# Patient Record
Sex: MALE | Race: ASIAN | URBAN - METROPOLITAN AREA
[De-identification: names, ages, dates, MRNs, and addresses within clinical notes are randomized per-mention and may not be internally consistent; named-entity substitution may affect disease eponyms.]

---

## 2020-02-22 ENCOUNTER — OFFICE VISIT (OUTPATIENT)
Dept: URGENT CARE | Facility: CLINIC | Age: 60
End: 2020-02-22
Payer: COMMERCIAL

## 2020-02-22 VITALS
DIASTOLIC BLOOD PRESSURE: 82 MMHG | SYSTOLIC BLOOD PRESSURE: 130 MMHG | OXYGEN SATURATION: 96 % | TEMPERATURE: 98 F | WEIGHT: 171.94 LBS | BODY MASS INDEX: 25.47 KG/M2 | HEIGHT: 69 IN | HEART RATE: 64 BPM | RESPIRATION RATE: 18 BRPM

## 2020-02-22 DIAGNOSIS — Z76.0 ENCOUNTER FOR MEDICATION REFILL: Primary | ICD-10-CM

## 2020-02-22 DIAGNOSIS — Z76.89 ENCOUNTER TO ESTABLISH CARE WITH NEW DOCTOR: ICD-10-CM

## 2020-02-22 PROCEDURE — 99203 OFFICE O/P NEW LOW 30 MIN: CPT | Mod: S$GLB,,, | Performed by: FAMILY MEDICINE

## 2020-02-22 PROCEDURE — 99203 PR OFFICE/OUTPT VISIT, NEW, LEVL III, 30-44 MIN: ICD-10-PCS | Mod: S$GLB,,, | Performed by: FAMILY MEDICINE

## 2020-02-22 RX ORDER — ROSUVASTATIN CALCIUM 5 MG/1
2.5 TABLET, COATED ORAL DAILY
Qty: 30 TABLET | Refills: 0 | Status: SHIPPED | OUTPATIENT
Start: 2020-02-22 | End: 2020-03-23 | Stop reason: SDUPTHER

## 2020-02-22 RX ORDER — TELMISARTAN 40 MG/1
40 TABLET ORAL DAILY
COMMUNITY
End: 2020-02-22 | Stop reason: SDUPTHER

## 2020-02-22 RX ORDER — TELMISARTAN 40 MG/1
40 TABLET ORAL DAILY
Qty: 30 TABLET | Refills: 0 | Status: SHIPPED | OUTPATIENT
Start: 2020-02-22 | End: 2020-03-23 | Stop reason: SDUPTHER

## 2020-02-22 RX ORDER — AMLODIPINE BESYLATE 2.5 MG/1
2.5 TABLET ORAL DAILY
Qty: 30 TABLET | Refills: 0 | Status: SHIPPED | OUTPATIENT
Start: 2020-02-22 | End: 2020-03-23 | Stop reason: SDUPTHER

## 2020-02-22 RX ORDER — AMLODIPINE BESYLATE 2.5 MG/1
2.5 TABLET ORAL DAILY
COMMUNITY
End: 2020-02-22 | Stop reason: SDUPTHER

## 2020-02-22 RX ORDER — ZOLPIDEM TARTRATE 5 MG/1
5 TABLET ORAL NIGHTLY PRN
COMMUNITY
End: 2020-02-22 | Stop reason: SDUPTHER

## 2020-02-22 RX ORDER — ZOLPIDEM TARTRATE 5 MG/1
5 TABLET ORAL NIGHTLY PRN
Qty: 30 TABLET | Refills: 0 | Status: SHIPPED | OUTPATIENT
Start: 2020-02-22 | End: 2020-03-23 | Stop reason: SDUPTHER

## 2020-02-22 RX ORDER — ROSUVASTATIN CALCIUM 5 MG/1
2.5 TABLET, COATED ORAL DAILY
COMMUNITY
End: 2020-02-22 | Stop reason: SDUPTHER

## 2020-02-22 NOTE — PROGRESS NOTES
"Subjective:       Patient ID: Mago Lema is a 69 y.o. male.    Vitals:  height is 5' 9.29" (1.76 m) and weight is 78 kg (171 lb 15.3 oz). His temperature is 97.9 °F (36.6 °C). His blood pressure is 130/82 and his pulse is 64. His respiration is 18 and oxygen saturation is 96%.     Chief Complaint: Hypertension    Pt speaks Swedish using PENNIE    Pt states he needs refill on all of his medication. He moved here and does not have a PCP. No cp, sob, headache    Hypertension   This is a new problem. The current episode started today. The problem is unchanged. Pertinent negatives include no blurred vision, chest pain, headaches or shortness of breath.       Constitution: Negative for chills, fatigue and fever.   HENT: Negative for congestion and sore throat.    Neck: Negative for painful lymph nodes.   Cardiovascular: Negative for chest pain and leg swelling.   Eyes: Negative for double vision and blurred vision.   Respiratory: Negative for cough and shortness of breath.    Gastrointestinal: Negative for nausea, vomiting and diarrhea.   Genitourinary: Negative for dysuria, frequency and urgency.   Musculoskeletal: Negative for joint pain, joint swelling, muscle cramps and muscle ache.   Skin: Negative for color change, pale and rash.   Allergic/Immunologic: Negative for seasonal allergies.   Neurological: Negative for dizziness, history of vertigo, light-headedness, passing out and headaches.   Hematologic/Lymphatic: Negative for swollen lymph nodes, easy bruising/bleeding and history of blood clots. Does not bruise/bleed easily.   Psychiatric/Behavioral: Negative for nervous/anxious, sleep disturbance and depression. The patient is not nervous/anxious.        Objective:      Physical Exam   Constitutional: He is oriented to person, place, and time. He appears well-developed and well-nourished. He is cooperative.  Non-toxic appearance. He does not appear ill. No distress.   HENT:   Head: Normocephalic and atraumatic. "   Right Ear: Hearing, tympanic membrane, external ear and ear canal normal.   Left Ear: Hearing, tympanic membrane, external ear and ear canal normal.   Nose: Nose normal. No mucosal edema, rhinorrhea or nasal deformity. No epistaxis. Right sinus exhibits no maxillary sinus tenderness and no frontal sinus tenderness. Left sinus exhibits no maxillary sinus tenderness and no frontal sinus tenderness.   Mouth/Throat: Uvula is midline, oropharynx is clear and moist and mucous membranes are normal. No trismus in the jaw. Normal dentition. No uvula swelling. No posterior oropharyngeal erythema.   Eyes: Conjunctivae and lids are normal. Right eye exhibits no discharge. Left eye exhibits no discharge. No scleral icterus.   Neck: Trachea normal, normal range of motion, full passive range of motion without pain and phonation normal. Neck supple.   Cardiovascular: Normal rate, regular rhythm, normal heart sounds, intact distal pulses and normal pulses.   Pulmonary/Chest: Effort normal and breath sounds normal. No respiratory distress.   Abdominal: Soft. Normal appearance and bowel sounds are normal. He exhibits no distension, no pulsatile midline mass and no mass. There is no tenderness.   Musculoskeletal: Normal range of motion. He exhibits no edema or deformity.   Neurological: He is alert and oriented to person, place, and time. He exhibits normal muscle tone. Coordination normal.   Skin: Skin is warm, dry, intact, not diaphoretic and not pale.   Psychiatric: He has a normal mood and affect. His speech is normal and behavior is normal. Judgment and thought content normal. Cognition and memory are normal.   Nursing note and vitals reviewed.        Assessment:       1. Encounter for medication refill    2. Encounter to establish care with new doctor        Plan:         Encounter for medication refill  -     zolpidem (AMBIEN) 5 MG Tab; Take 1 tablet (5 mg total) by mouth nightly as needed.  Dispense: 30 tablet; Refill: 0  -      telmisartan (MICARDIS) 40 MG Tab; Take 1 tablet (40 mg total) by mouth once daily.  Dispense: 30 tablet; Refill: 0  -     rosuvastatin (CRESTOR) 5 MG tablet; Take 0.5 tablets (2.5 mg total) by mouth once daily.  Dispense: 30 tablet; Refill: 0  -     amLODIPine (NORVASC) 2.5 MG tablet; Take 1 tablet (2.5 mg total) by mouth once daily.  Dispense: 30 tablet; Refill: 0    Encounter to establish care with new doctor  -     Ambulatory referral/consult to Internal Medicine    discussed with pt needs to see primary care for further refills  Placed referral- went over paperwork using PENNIE    Patient Instructions   PLEASE READ YOUR DISCHARGE INSTRUCTIONS ENTIRELY AS IT CONTAINS IMPORTANT INFORMATION.    Please call [phone] to get with primary care    Please go to the emergency room if you experience chest pain, shortness of breath, funny heart beats, headache, blurred vision, weakness in one arm or leg, slurred speech, numbness, inability to walk or talk, confusion.     Please return or see your primary care doctor if you develop new or worsening symptoms.     You must understand that you have received an Urgent Care treatment only and that you may be released before all of your medical problems are known or treated.

## 2020-02-22 NOTE — PATIENT INSTRUCTIONS
PLEASE READ YOUR DISCHARGE INSTRUCTIONS ENTIRELY AS IT CONTAINS IMPORTANT INFORMATION.    Please call [phone] to get with primary care    Please go to the emergency room if you experience chest pain, shortness of breath, funny heart beats, headache, blurred vision, weakness in one arm or leg, slurred speech, numbness, inability to walk or talk, confusion.     Please return or see your primary care doctor if you develop new or worsening symptoms.     You must understand that you have received an Urgent Care treatment only and that you may be released before all of your medical problems are known or treated.

## 2020-03-09 ENCOUNTER — PATIENT OUTREACH (OUTPATIENT)
Dept: ADMINISTRATIVE | Facility: HOSPITAL | Age: 60
End: 2020-03-09

## 2020-03-09 NOTE — PROGRESS NOTES
Pre-visit chart review completed.    Patient due for the following    Hepatitis C Screening     Lipid Panel     HIV Screening     TETANUS VACCINE     Shingles Vaccine (1 of 2)    Colonoscopy     Influenza Vaccine (1)      Patient new to provider.

## 2020-03-23 ENCOUNTER — LAB VISIT (OUTPATIENT)
Dept: LAB | Facility: HOSPITAL | Age: 60
End: 2020-03-23
Attending: FAMILY MEDICINE
Payer: COMMERCIAL

## 2020-03-23 ENCOUNTER — OFFICE VISIT (OUTPATIENT)
Dept: PRIMARY CARE CLINIC | Facility: CLINIC | Age: 60
End: 2020-03-23
Payer: COMMERCIAL

## 2020-03-23 VITALS
TEMPERATURE: 99 F | HEART RATE: 69 BPM | WEIGHT: 178.13 LBS | BODY MASS INDEX: 25.5 KG/M2 | SYSTOLIC BLOOD PRESSURE: 132 MMHG | HEIGHT: 70 IN | DIASTOLIC BLOOD PRESSURE: 86 MMHG | OXYGEN SATURATION: 98 %

## 2020-03-23 DIAGNOSIS — R36.1 HEMATOSPERMIA: ICD-10-CM

## 2020-03-23 DIAGNOSIS — Z11.8 SCREENING FOR CHLAMYDIAL DISEASE: ICD-10-CM

## 2020-03-23 DIAGNOSIS — M79.652 LEFT THIGH PAIN: ICD-10-CM

## 2020-03-23 DIAGNOSIS — Z12.5 SCREENING FOR PROSTATE CANCER: ICD-10-CM

## 2020-03-23 DIAGNOSIS — Z23 NEED FOR SHINGLES VACCINE: ICD-10-CM

## 2020-03-23 DIAGNOSIS — M25.552 LEFT HIP PAIN: ICD-10-CM

## 2020-03-23 DIAGNOSIS — Z12.11 SCREENING FOR COLON CANCER: ICD-10-CM

## 2020-03-23 DIAGNOSIS — Z13.1 SCREENING FOR DIABETES MELLITUS: ICD-10-CM

## 2020-03-23 DIAGNOSIS — I10 ESSENTIAL HYPERTENSION: ICD-10-CM

## 2020-03-23 DIAGNOSIS — Z01.89 ENCOUNTER FOR ROUTINE LABORATORY TESTING: ICD-10-CM

## 2020-03-23 DIAGNOSIS — E78.2 MIXED HYPERLIPIDEMIA: ICD-10-CM

## 2020-03-23 DIAGNOSIS — Z11.3 SCREENING FOR GONORRHEA: ICD-10-CM

## 2020-03-23 DIAGNOSIS — Z53.20 HIV SCREENING DECLINED: ICD-10-CM

## 2020-03-23 DIAGNOSIS — Z13.220 ENCOUNTER FOR SCREENING FOR LIPID DISORDER: ICD-10-CM

## 2020-03-23 DIAGNOSIS — Z11.59 NEED FOR HEPATITIS C SCREENING TEST: ICD-10-CM

## 2020-03-23 DIAGNOSIS — Z00.00 ANNUAL PHYSICAL EXAM: Primary | ICD-10-CM

## 2020-03-23 DIAGNOSIS — F51.04 PSYCHOPHYSIOLOGICAL INSOMNIA: ICD-10-CM

## 2020-03-23 LAB
ALBUMIN SERPL BCP-MCNC: 4 G/DL (ref 3.5–5.2)
ALP SERPL-CCNC: 42 U/L (ref 55–135)
ALT SERPL W/O P-5'-P-CCNC: 93 U/L (ref 10–44)
ANION GAP SERPL CALC-SCNC: 6 MMOL/L (ref 8–16)
AST SERPL-CCNC: 37 U/L (ref 10–40)
BASOPHILS # BLD AUTO: 0.03 K/UL (ref 0–0.2)
BASOPHILS NFR BLD: 0.5 % (ref 0–1.9)
BILIRUB SERPL-MCNC: 0.7 MG/DL (ref 0.1–1)
BILIRUB UR QL STRIP: NEGATIVE
BUN SERPL-MCNC: 14 MG/DL (ref 6–20)
C TRACH DNA SPEC QL NAA+PROBE: NOT DETECTED
CALCIUM SERPL-MCNC: 9.7 MG/DL (ref 8.7–10.5)
CHLORIDE SERPL-SCNC: 104 MMOL/L (ref 95–110)
CHOLEST SERPL-MCNC: 177 MG/DL (ref 120–199)
CHOLEST/HDLC SERPL: 3.2 {RATIO} (ref 2–5)
CLARITY UR REFRACT.AUTO: CLEAR
CO2 SERPL-SCNC: 28 MMOL/L (ref 23–29)
COLOR UR AUTO: YELLOW
CREAT SERPL-MCNC: 1 MG/DL (ref 0.5–1.4)
DIFFERENTIAL METHOD: ABNORMAL
EOSINOPHIL # BLD AUTO: 0.1 K/UL (ref 0–0.5)
EOSINOPHIL NFR BLD: 2.4 % (ref 0–8)
ERYTHROCYTE [DISTWIDTH] IN BLOOD BY AUTOMATED COUNT: 12.2 % (ref 11.5–14.5)
EST. GFR  (AFRICAN AMERICAN): >60 ML/MIN/1.73 M^2
EST. GFR  (NON AFRICAN AMERICAN): >60 ML/MIN/1.73 M^2
ESTIMATED AVG GLUCOSE: 111 MG/DL (ref 68–131)
GLUCOSE SERPL-MCNC: 88 MG/DL (ref 70–110)
GLUCOSE UR QL STRIP: NEGATIVE
HBA1C MFR BLD HPLC: 5.5 % (ref 4–5.6)
HCT VFR BLD AUTO: 47.7 % (ref 40–54)
HDLC SERPL-MCNC: 55 MG/DL (ref 40–75)
HDLC SERPL: 31.1 % (ref 20–50)
HGB BLD-MCNC: 15.7 G/DL (ref 14–18)
HGB UR QL STRIP: NEGATIVE
IMM GRANULOCYTES # BLD AUTO: 0.01 K/UL (ref 0–0.04)
IMM GRANULOCYTES NFR BLD AUTO: 0.2 % (ref 0–0.5)
KETONES UR QL STRIP: NEGATIVE
LDLC SERPL CALC-MCNC: 96.8 MG/DL (ref 63–159)
LEUKOCYTE ESTERASE UR QL STRIP: NEGATIVE
LYMPHOCYTES # BLD AUTO: 1.6 K/UL (ref 1–4.8)
LYMPHOCYTES NFR BLD: 29.8 % (ref 18–48)
MCH RBC QN AUTO: 32.6 PG (ref 27–31)
MCHC RBC AUTO-ENTMCNC: 32.9 G/DL (ref 32–36)
MCV RBC AUTO: 99 FL (ref 82–98)
MONOCYTES # BLD AUTO: 0.5 K/UL (ref 0.3–1)
MONOCYTES NFR BLD: 8.2 % (ref 4–15)
N GONORRHOEA DNA SPEC QL NAA+PROBE: NOT DETECTED
NEUTROPHILS # BLD AUTO: 3.2 K/UL (ref 1.8–7.7)
NEUTROPHILS NFR BLD: 58.9 % (ref 38–73)
NITRITE UR QL STRIP: NEGATIVE
NONHDLC SERPL-MCNC: 122 MG/DL
NRBC BLD-RTO: 0 /100 WBC
PH UR STRIP: 6 [PH] (ref 5–8)
PLATELET # BLD AUTO: 188 K/UL (ref 150–350)
PMV BLD AUTO: 10.9 FL (ref 9.2–12.9)
POTASSIUM SERPL-SCNC: 4.1 MMOL/L (ref 3.5–5.1)
PROT SERPL-MCNC: 7.2 G/DL (ref 6–8.4)
PROT UR QL STRIP: NEGATIVE
RBC # BLD AUTO: 4.82 M/UL (ref 4.6–6.2)
SODIUM SERPL-SCNC: 138 MMOL/L (ref 136–145)
SP GR UR STRIP: 1.01 (ref 1–1.03)
TRIGL SERPL-MCNC: 126 MG/DL (ref 30–150)
URN SPEC COLLECT METH UR: NORMAL
WBC # BLD AUTO: 5.5 K/UL (ref 3.9–12.7)

## 2020-03-23 PROCEDURE — 87491 CHLMYD TRACH DNA AMP PROBE: CPT

## 2020-03-23 PROCEDURE — 36415 COLL VENOUS BLD VENIPUNCTURE: CPT | Mod: PN

## 2020-03-23 PROCEDURE — 99396 PR PREVENTIVE VISIT,EST,40-64: ICD-10-PCS | Mod: S$GLB,,, | Performed by: FAMILY MEDICINE

## 2020-03-23 PROCEDURE — 85025 COMPLETE CBC W/AUTO DIFF WBC: CPT

## 2020-03-23 PROCEDURE — 99999 PR PBB SHADOW E&M-EST. PATIENT-LVL V: ICD-10-PCS | Mod: PBBFAC,,, | Performed by: FAMILY MEDICINE

## 2020-03-23 PROCEDURE — 80053 COMPREHEN METABOLIC PANEL: CPT

## 2020-03-23 PROCEDURE — 99396 PREV VISIT EST AGE 40-64: CPT | Mod: S$GLB,,, | Performed by: FAMILY MEDICINE

## 2020-03-23 PROCEDURE — 99999 PR PBB SHADOW E&M-EST. PATIENT-LVL V: CPT | Mod: PBBFAC,,, | Performed by: FAMILY MEDICINE

## 2020-03-23 PROCEDURE — 83036 HEMOGLOBIN GLYCOSYLATED A1C: CPT

## 2020-03-23 PROCEDURE — 81003 URINALYSIS AUTO W/O SCOPE: CPT

## 2020-03-23 PROCEDURE — 80061 LIPID PANEL: CPT

## 2020-03-23 RX ORDER — ZOLPIDEM TARTRATE 5 MG/1
5 TABLET ORAL NIGHTLY PRN
Qty: 30 TABLET | Refills: 5 | Status: SHIPPED | OUTPATIENT
Start: 2020-03-23 | End: 2020-12-24

## 2020-03-23 RX ORDER — AMLODIPINE BESYLATE 2.5 MG/1
2.5 TABLET ORAL DAILY
Qty: 90 TABLET | Refills: 3 | Status: SHIPPED | OUTPATIENT
Start: 2020-03-23 | End: 2020-12-14 | Stop reason: SDUPTHER

## 2020-03-23 RX ORDER — TELMISARTAN 40 MG/1
40 TABLET ORAL DAILY
Qty: 90 TABLET | Refills: 3 | Status: SHIPPED | OUTPATIENT
Start: 2020-03-23 | End: 2020-12-14 | Stop reason: SDUPTHER

## 2020-03-23 RX ORDER — ROSUVASTATIN CALCIUM 5 MG/1
2.5 TABLET, COATED ORAL DAILY
Qty: 45 TABLET | Refills: 3 | Status: SHIPPED | OUTPATIENT
Start: 2020-03-23 | End: 2020-12-14 | Stop reason: SDUPTHER

## 2020-03-23 NOTE — PROGRESS NOTES
Subjective:       Patient ID: Mago Lema is a 59 y.o. male.    Chief Complaint: Annual Exam; Leg Pain (Inner Thigh / Left); and Penile Discharge (Blood in Semen)    60 yo male presents for annual physical exam.  Onsite  used for this visit.    This is his 1st visit with me.    For 3 months he has left upper inner thigh pain; non radiating, 4-5 out of 10 in severity, described as a heavy and full sensation; constant, exacerbated by standing and bending at the hip.   No loss of sensation, no weakness or paralysis.    Over the past 2 months, he had 3 episodes of blood in his semen. Sometimes bright red blood and at other times dark in color. He noticed the blood in his ejaculate after masturbation and at another time there was blood mixed in ejaculate left over in the condom. He also has an uncomfortable pain in his scrotum.  No urinary complaints: burning, hesitancy, dribbling, foul smelling or cloudy urine.    The following portions of the patient's history were reviewed and updated as appropriate: allergies, current medications, past family history, past medical history, past social history, past surgical history and problem list.    Review of Systems   Constitutional: Negative for activity change, appetite change, chills, diaphoresis, fatigue, fever and unexpected weight change.   HENT: Negative for congestion, dental problem, facial swelling, nosebleeds, postnasal drip, rhinorrhea, sinus pain, sore throat, tinnitus and trouble swallowing.    Eyes: Negative for pain, discharge, itching and visual disturbance.   Respiratory: Negative for apnea, chest tightness, shortness of breath, wheezing and stridor.    Cardiovascular: Negative for chest pain, palpitations and leg swelling.   Gastrointestinal: Negative for abdominal distention, abdominal pain, constipation, diarrhea, nausea, rectal pain and vomiting.   Endocrine: Negative for cold intolerance, heat intolerance and polyuria.   Genitourinary: Negative for  "difficulty urinating, dysuria, frequency, hematuria and urgency.   Musculoskeletal: Positive for arthralgias. Negative for gait problem, myalgias, neck pain and neck stiffness.   Skin: Negative for color change and rash.   Neurological: Negative for dizziness, tremors, seizures, syncope, facial asymmetry, weakness and headaches.   Hematological: Negative for adenopathy. Does not bruise/bleed easily.   Psychiatric/Behavioral: Positive for sleep disturbance. Negative for agitation, confusion, hallucinations, self-injury and suicidal ideas. The patient is not hyperactive.        Objective:       Vitals:    03/23/20 0818   BP: 132/86   BP Location: Right arm   Patient Position: Sitting   BP Method: Medium (Manual)   Pulse: 69   Temp: 99 °F (37.2 °C)   TempSrc: Oral   SpO2: 98%   Weight: 80.8 kg (178 lb 2.1 oz)   Height: 5' 9.5" (1.765 m)     Physical Exam   Constitutional: He is oriented to person, place, and time. He appears well-developed and well-nourished. No distress.   HENT:   Head: Normocephalic and atraumatic.   Right Ear: External ear normal.   Left Ear: External ear normal.   Mouth/Throat: No oropharyngeal exudate.   Eyes: Pupils are equal, round, and reactive to light. Conjunctivae and EOM are normal. Right eye exhibits no discharge. Left eye exhibits no discharge. No scleral icterus.   Neck: Normal range of motion. Neck supple. No thyromegaly present.   Cardiovascular: Normal rate, regular rhythm, normal heart sounds and intact distal pulses. Exam reveals no gallop and no friction rub.   No murmur heard.  Pulmonary/Chest: Effort normal and breath sounds normal. No respiratory distress. He exhibits no tenderness.   Abdominal: Soft. Bowel sounds are normal. He exhibits no distension and no mass. There is no tenderness. There is no rebound and no guarding.   Musculoskeletal: Normal range of motion. He exhibits no edema.   Tenderness on palpation of adductor muscles of upper inner left thigh   Lymphadenopathy: "     He has no cervical adenopathy.   Neurological: He is alert and oriented to person, place, and time. He displays normal reflexes. No cranial nerve deficit. He exhibits normal muscle tone. Coordination normal.   Skin: Skin is warm. Capillary refill takes less than 2 seconds. No rash noted. He is not diaphoretic.   Psychiatric: He has a normal mood and affect. Judgment and thought content normal.       Assessment:       1. Annual physical exam    2. Left hip pain    3. Left thigh pain    4. Hematospermia    5. Essential hypertension    6. Mixed hyperlipidemia    7. Psychophysiological insomnia    8. Encounter for routine laboratory testing    9. Screening for prostate cancer    10. Encounter for screening for lipid disorder    11. Screening for chlamydial disease    12. Screening for gonorrhea    13. Screening for diabetes mellitus    14. Need for hepatitis C screening test    15. HIV screening declined    16. Screening for colon cancer    17. Need for shingles vaccine        Plan:       1. Annual physical exam  Age appropriate prevention guidelines implemented, unless patient refused  Encourage Advanced directives  Labs ordered   Immunizations reviewed. Encourage yearly influenza vaccine.  Patient Counseling:  --Nutrition: Encourage healthy food choices, adequate water intake, moderate sodium/caffeine intake, diet low in saturated fat and cholesterol. Importance of caloric balance and sufficient intake of fresh fruits, vegetables, fiber, calcium, iron, and 1 mg of folate supplement per day (for females capable of pregnancy).  --Exercise: Stressed the importance of regular exercise.   --Substance Abuse: Abstinence from tobacco products. No more than 2 alcoholic drinks per day in men or 1 alcoholic drink per day in women. Avoid illicit drugs, driving or other dangerous activities under the influence. In the event of abuse, treatment offered.   --Sexuality: Safe sex practices to avoid sexually transmitted diseases;  careful partner selection, use of condoms and contraceptive alternatives, avoidance of unintended pregnancy in fertile women of child-bearing age  --Injury prevention: encourage safety belts, safety helmets, smoke detector.  --Dental health: Discussed importance of regular tooth brushing X2 daily, flossing X1 daily, and routine dental visits.  --Encourage use of sun screen, wear sun protective clothing  --Encourage routine eye exams    2. Left hip pain  -     X-Ray Hip 2 View Left; Future; Expected date: 03/23/2020    3. Left thigh pain  -     X-Ray Hip 2 View Left; Future; Expected date: 03/23/2020  -     Ambulatory referral/consult to Sports Medicine; Future; Expected date: 03/30/2020    4. Hematospermia  -     Urinalysis  -     Ambulatory referral/consult to Urology; Future; Expected date: 03/30/2020    5. Essential hypertension  -     amLODIPine (NORVASC) 2.5 MG tablet; Take 1 tablet (2.5 mg total) by mouth once daily.  Dispense: 90 tablet; Refill: 3  -     telmisartan (MICARDIS) 40 MG Tab; Take 1 tablet (40 mg total) by mouth once daily.  Dispense: 90 tablet; Refill: 3    6. Mixed hyperlipidemia  -     Lipid panel; Future; Expected date: 03/23/2020  -     rosuvastatin (CRESTOR) 5 MG tablet; Take 0.5 tablets (2.5 mg total) by mouth once daily.  Dispense: 45 tablet; Refill: 3    7. Psychophysiological insomnia  -     zolpidem (AMBIEN) 5 MG Tab; Take 1 tablet (5 mg total) by mouth nightly as needed.  Dispense: 30 tablet; Refill: 5.  Patient encouraged to try cognitive behavioral therapy for insomnia using apps.  Discouraged taking Ambien 10 mg.    8. Encounter for routine laboratory testing  -     CBC auto differential; Future; Expected date: 03/23/2020  -     Comprehensive metabolic panel; Future; Expected date: 03/23/2020    9. Screening for prostate cancer  -     PSA, Screening; Future; Expected date: 03/23/2020    10. Encounter for screening for lipid disorder  Lipid panel ordered    11. Screening for  chlamydial disease  12. Screening for gonorrhea  -     C. trachomatis/N. gonorrhoeae by AMP DNA [facility]; Urine    13. Screening for diabetes mellitus  -     Hemoglobin A1c; Future; Expected date: 03/23/2020    14. Need for hepatitis C screening test  -     Hepatitis C Antibody; Future; Expected date: 03/23/2020    15. HIV screening declined  -     Cancel: HIV 1/2 Ag/Ab (4th Gen); Future; Expected date: 03/23/2020    16. Screening for colon cancer  -     Fecal Immunochemical Test (iFOBT); Future  He declined colonoscopy testing.  Discussed that should stool testing return abnormal then a colonoscopy will be indicated.    17. Need for shingles vaccine  Declined.  Risks and benefits discussed.    Disclaimer: This note has been generated using voice-recognition software. There may be typographical errors that have been missed during proof-reading

## 2020-03-23 NOTE — LETTER
March 23, 2020      Raul Mcintyre, NP  [address] [address]           [facility] - Primary care  [address] [address]  Phone: [phone]  Fax: [phone]          Patient: Mago Lema   MR Number: [id number]   YOB: 1960   Date of Visit: 3/23/2020       Dear Raul Mcintyre:    Thank you for referring Mago Lema to me for evaluation. Attached you will find relevant portions of my assessment and plan of care.    If you have questions, please do not hesitate to call me. I look forward to following Mago Lema along with you.    Sincerely,    Fabiola Olivia MD    Enclosure  CC:  No Recipients    If you would like to receive this communication electronically, please contact [email or web address] or [phone] to request more information on [facility] access.    For providers and/or their staff who would like to refer a patient to [facility], please contact us through our one-stop-shop provider referral line, [facility], at [phone].    If you feel you have received this communication in error or would no longer like to receive these types of communications, please e-mail [email or web address]

## 2020-04-01 ENCOUNTER — LAB VISIT (OUTPATIENT)
Dept: LAB | Facility: HOSPITAL | Age: 60
End: 2020-04-01
Payer: COMMERCIAL

## 2020-04-01 DIAGNOSIS — Z12.11 SCREENING FOR COLON CANCER: ICD-10-CM

## 2020-04-01 PROCEDURE — 82274 ASSAY TEST FOR BLOOD FECAL: CPT

## 2020-04-06 LAB — HEMOCCULT STL QL IA: NEGATIVE

## 2020-12-11 ENCOUNTER — TELEPHONE (OUTPATIENT)
Dept: PRIMARY CARE CLINIC | Facility: CLINIC | Age: 60
End: 2020-12-11

## 2020-12-11 DIAGNOSIS — F51.04 PSYCHOPHYSIOLOGICAL INSOMNIA: ICD-10-CM

## 2020-12-11 NOTE — TELEPHONE ENCOUNTER
----- Message from Nimo Munguia sent at 12/11/2020 11:58 AM CST -----  Regarding: refil request  Contact: Patient [phone]  Requesting an RX refill or new RX.  Is this a refill or new RX: refill  RX name and strength: amLODIPine (NORVASC) 2.5 MG tablet  Is this a 30 day or 90 day RX: 90  Pharmacy name and phone # [id number] [place] [address] [address] [facility] [phone] (Phone)  [phone] (Fax)      2  Requesting an RX refill or new RX.  Is this a refill or new RX: refill  RX name and strength: telmisartan (MICARDIS) 40 MG Tab  Is this a 30 day or 90 day RX: 90  Pharmacy name and phone # [facility] [place] [address] [address] AT [facility] [place] [employer] [facility] [phone] (Phone)  [phone] (Fax)    3  Requesting an RX refill or new RX.  Is this a refill or new RX: refill  RX name and strength: zolpidem (AMBIEN) 5 MG Tab  Is this a 30 day or 90 day RX: 90  Pharmacy name and phone # [facility] #[id number] [place] [address] [address] AT [facility] [employer] [facility] [phone] (Phone)  [phone] (Fax)      Comments:     4  Requesting an RX refill or new RX.  Is this a refill or new RX: refill  RX name and strength: rosuvastatin (CRESTOR) 5 MG tablet  Is this a 30 day or 90 day RX:   Pharmacy name and phone # [facility] [address] [place] [address] [address] AT [facility] [employer] [facility] [phone] (Phone)  [phone] (Fax)

## 2020-12-11 NOTE — TELEPHONE ENCOUNTER
JULIAM requesting a return call. Spoke with [facility], the patient has refills but the prescriptions are not covered at [facility] under the patient's insurance plan. Patient needs to clarify which pharmacy the prescriptions should go to.

## 2020-12-14 DIAGNOSIS — F51.04 PSYCHOPHYSIOLOGICAL INSOMNIA: ICD-10-CM

## 2020-12-14 RX ORDER — ZOLPIDEM TARTRATE 5 MG/1
5 TABLET ORAL NIGHTLY PRN
Qty: 30 TABLET | Refills: 5 | Status: CANCELLED | OUTPATIENT
Start: 2020-12-14

## 2020-12-15 DIAGNOSIS — F51.04 PSYCHOPHYSIOLOGICAL INSOMNIA: ICD-10-CM

## 2020-12-15 RX ORDER — ZOLPIDEM TARTRATE 5 MG/1
5 TABLET ORAL NIGHTLY PRN
Qty: 30 TABLET | Refills: 5 | Status: CANCELLED | OUTPATIENT
Start: 2020-12-15

## 2020-12-16 RX ORDER — ZOLPIDEM TARTRATE 5 MG/1
5 TABLET ORAL NIGHTLY PRN
Qty: 30 TABLET | Refills: 5 | OUTPATIENT
Start: 2020-12-16

## 2020-12-17 NOTE — TELEPHONE ENCOUNTER
Spoke with patient, informed appointment scheduled incorrectly for audio tomorrow.  Will need to reschedule to come into office.  Patient asked that I send a message with new appointment.

## 2020-12-24 ENCOUNTER — OFFICE VISIT (OUTPATIENT)
Dept: PRIMARY CARE CLINIC | Facility: CLINIC | Age: 60
End: 2020-12-24
Payer: COMMERCIAL

## 2020-12-24 VITALS
TEMPERATURE: 98 F | HEIGHT: 70 IN | SYSTOLIC BLOOD PRESSURE: 136 MMHG | BODY MASS INDEX: 25 KG/M2 | OXYGEN SATURATION: 100 % | HEART RATE: 58 BPM | DIASTOLIC BLOOD PRESSURE: 80 MMHG | WEIGHT: 174.63 LBS

## 2020-12-24 DIAGNOSIS — I10 ESSENTIAL HYPERTENSION: ICD-10-CM

## 2020-12-24 DIAGNOSIS — F51.04 PSYCHOPHYSIOLOGICAL INSOMNIA: Primary | ICD-10-CM

## 2020-12-24 DIAGNOSIS — E78.2 MIXED HYPERLIPIDEMIA: ICD-10-CM

## 2020-12-24 PROCEDURE — 99214 OFFICE O/P EST MOD 30 MIN: CPT | Mod: S$GLB,,, | Performed by: FAMILY MEDICINE

## 2020-12-24 PROCEDURE — 99999 PR PBB SHADOW E&M-EST. PATIENT-LVL IV: CPT | Mod: PBBFAC,,, | Performed by: FAMILY MEDICINE

## 2020-12-24 PROCEDURE — 99999 PR PBB SHADOW E&M-EST. PATIENT-LVL IV: ICD-10-PCS | Mod: PBBFAC,,, | Performed by: FAMILY MEDICINE

## 2020-12-24 PROCEDURE — 99214 PR OFFICE/OUTPT VISIT, EST, LEVL IV, 30-39 MIN: ICD-10-PCS | Mod: S$GLB,,, | Performed by: FAMILY MEDICINE

## 2020-12-24 RX ORDER — TELMISARTAN 40 MG/1
40 TABLET ORAL DAILY
Qty: 90 TABLET | Refills: 3 | Status: SHIPPED | OUTPATIENT
Start: 2020-12-24 | End: 2021-10-07 | Stop reason: SDUPTHER

## 2020-12-24 RX ORDER — AMLODIPINE BESYLATE 2.5 MG/1
2.5 TABLET ORAL DAILY
Qty: 90 TABLET | Refills: 3 | Status: SHIPPED | OUTPATIENT
Start: 2020-12-24 | End: 2021-10-07 | Stop reason: SDUPTHER

## 2020-12-24 RX ORDER — ROSUVASTATIN CALCIUM 5 MG/1
2.5 TABLET, COATED ORAL DAILY
Qty: 15 TABLET | Refills: 11 | Status: SHIPPED | OUTPATIENT
Start: 2020-12-24 | End: 2020-12-24 | Stop reason: SDUPTHER

## 2020-12-24 RX ORDER — RAMELTEON 8 MG/1
8 TABLET ORAL NIGHTLY
Qty: 90 TABLET | Refills: 3 | Status: SHIPPED | OUTPATIENT
Start: 2020-12-24 | End: 2021-02-08

## 2020-12-24 RX ORDER — ROSUVASTATIN CALCIUM 5 MG/1
2.5 TABLET, COATED ORAL DAILY
Qty: 45 TABLET | Refills: 3 | Status: SHIPPED | OUTPATIENT
Start: 2020-12-24 | End: 2021-10-07 | Stop reason: SDUPTHER

## 2020-12-24 NOTE — PROGRESS NOTES
Subjective:       Patient ID: Mago Lema is a 60 y.o. male.    Chief Complaint: Medication Refill       used for this visit.     Patient requesting refill of Ambien. At last visit in March, 2020 we discussed that long term use of Ambien is not recommended. Despite this conversation, I have received multiple prescription refill requests for Ambien. He uses Ambien to fall asleep mostly. He reports stress at work.  He denies any side effects with Ambien.      He also requests refills of all other medication: amlodipine, rosuvastatin, telmisartan. His current prescirpitons are good until March 2021 but he would liek them renewed today for 1 year supply.    The following portions of the patient's history were reviewed and updated as appropriate: allergies, current medications, past family history, past medical history, past social history, past surgical history and problem list.         Review of Systems   Constitutional: Negative for activity change, appetite change, chills, diaphoresis, fatigue, fever and unexpected weight change.   HENT: Negative for nasal congestion, dental problem, facial swelling, nosebleeds, postnasal drip, rhinorrhea, sore throat, tinnitus and trouble swallowing.    Eyes: Negative for pain, discharge, itching and visual disturbance.   Respiratory: Negative for apnea, chest tightness, shortness of breath, wheezing and stridor.    Cardiovascular: Negative for chest pain, palpitations and leg swelling.   Gastrointestinal: Negative for abdominal distention, abdominal pain, constipation, diarrhea, nausea, rectal pain and vomiting.   Endocrine: Negative for cold intolerance, heat intolerance and polyuria.   Genitourinary: Negative for difficulty urinating, dysuria, frequency, hematuria and urgency.   Musculoskeletal: Negative for arthralgias, gait problem, myalgias, neck pain and neck stiffness.   Integumentary:  Negative for color change and rash.   Neurological: Negative for dizziness,  "tremors, seizures, syncope, facial asymmetry, weakness and headaches.   Hematological: Negative for adenopathy. Does not bruise/bleed easily.   Psychiatric/Behavioral: Positive for dysphoric mood and sleep disturbance. Negative for agitation, confusion, hallucinations, self-injury and suicidal ideas. The patient is not hyperactive.           Objective:       Vitals:    12/24/20 1024 12/24/20 1054   BP: (!) 140/84 136/80   Pulse: (!) 58    Temp: 98 °F (36.7 °C)    TempSrc: Oral    SpO2: 100%    Weight: 79.2 kg (174 lb 9.7 oz)    Height: 5' 9.5" (1.765 m)      Physical Exam  Constitutional:       General: He is not in acute distress.     Appearance: He is not ill-appearing, toxic-appearing or diaphoretic.   HENT:      Head: Atraumatic.   Neck:      Musculoskeletal: Neck supple.   Cardiovascular:      Rate and Rhythm: Normal rate and regular rhythm.      Pulses: Normal pulses.      Heart sounds: Normal heart sounds.   Pulmonary:      Effort: Pulmonary effort is normal.      Breath sounds: Normal breath sounds.   Abdominal:      General: Bowel sounds are normal.      Palpations: Abdomen is soft.      Tenderness: There is no rebound.   Musculoskeletal:      Right lower leg: No edema.      Left lower leg: No edema.   Skin:     General: Skin is warm.   Neurological:      General: No focal deficit present.      Mental Status: He is alert and oriented to person, place, and time.   Psychiatric:         Mood and Affect: Mood normal.         Thought Content: Thought content normal.         Judgment: Judgment normal.         Assessment:       1. Psychophysiological insomnia    2. Mixed hyperlipidemia    3. Essential hypertension        Plan:       1. Psychophysiological insomnia  After a long discussion, he is agreeable to try alternative to Ambien. Discussed the risks of long term use of sleep aids.   -     ramelteon (ROZEREM) 8 mg tablet; Take 1 tablet (8 mg total) by mouth every evening.  Dispense: 90 tablet; Refill: 3. Side " effects reviewed.  If no results with Ramelteon then use Doxepin. If he fails both Ramelteon and Doxepin then Ambien use can be considered but he will require intermittent visits for monitoring.  He will benefit from CBT-i  -     Ambulatory referral/consult to Psychiatry; Future    2. Mixed hyperlipidemia  -     rosuvastatin (CRESTOR) 5 MG tablet; Take 0.5 tablets (2.5 mg total) by mouth once daily.  Dispense: 45 tablet; Refill: 3    3. Essential hypertension  -     amLODIPine (NORVASC) 2.5 MG tablet; Take 1 tablet (2.5 mg total) by mouth once daily.  Dispense: 90 tablet; Refill: 3  -     telmisartan (MICARDIS) 40 MG Tab; Take 1 tablet (40 mg total) by mouth once daily.  Dispense: 90 tablet; Refill: 3  Repeat blood pressure trended toward goal.  Continue current antihypertensive therapy.    This was a 30 min visit, 20 min of which were spent counseling and coordinating care.    Disclaimer: This note has been generated using voice-recognition software. There may be typographical errors that have been missed during proof-reading

## 2021-02-07 ENCOUNTER — PATIENT MESSAGE (OUTPATIENT)
Dept: PRIMARY CARE CLINIC | Facility: CLINIC | Age: 61
End: 2021-02-07

## 2021-02-08 ENCOUNTER — PATIENT MESSAGE (OUTPATIENT)
Dept: PRIMARY CARE CLINIC | Facility: CLINIC | Age: 61
End: 2021-02-08

## 2021-02-08 RX ORDER — DOXEPIN HYDROCHLORIDE 25 MG/1
25 CAPSULE ORAL NIGHTLY
Qty: 30 CAPSULE | Refills: 2 | Status: SHIPPED | OUTPATIENT
Start: 2021-02-08 | End: 2021-10-07

## 2021-03-08 ENCOUNTER — PATIENT MESSAGE (OUTPATIENT)
Dept: ADMINISTRATIVE | Facility: HOSPITAL | Age: 61
End: 2021-03-08

## 2021-03-10 ENCOUNTER — PATIENT OUTREACH (OUTPATIENT)
Dept: ADMINISTRATIVE | Facility: HOSPITAL | Age: 61
End: 2021-03-10

## 2021-05-31 ENCOUNTER — PATIENT OUTREACH (OUTPATIENT)
Dept: ADMINISTRATIVE | Facility: HOSPITAL | Age: 61
End: 2021-05-31

## 2021-07-21 ENCOUNTER — PATIENT OUTREACH (OUTPATIENT)
Dept: ADMINISTRATIVE | Facility: HOSPITAL | Age: 61
End: 2021-07-21

## 2021-10-07 ENCOUNTER — LAB VISIT (OUTPATIENT)
Dept: LAB | Facility: HOSPITAL | Age: 61
End: 2021-10-07
Attending: STUDENT IN AN ORGANIZED HEALTH CARE EDUCATION/TRAINING PROGRAM
Payer: COMMERCIAL

## 2021-10-07 ENCOUNTER — IMMUNIZATION (OUTPATIENT)
Dept: INTERNAL MEDICINE | Facility: CLINIC | Age: 61
End: 2021-10-07
Payer: COMMERCIAL

## 2021-10-07 ENCOUNTER — OFFICE VISIT (OUTPATIENT)
Dept: INTERNAL MEDICINE | Facility: CLINIC | Age: 61
End: 2021-10-07
Payer: COMMERCIAL

## 2021-10-07 VITALS
SYSTOLIC BLOOD PRESSURE: 140 MMHG | WEIGHT: 181.88 LBS | DIASTOLIC BLOOD PRESSURE: 99 MMHG | HEART RATE: 79 BPM | OXYGEN SATURATION: 98 % | BODY MASS INDEX: 28.55 KG/M2 | HEIGHT: 67 IN

## 2021-10-07 DIAGNOSIS — Z12.11 COLON CANCER SCREENING: ICD-10-CM

## 2021-10-07 DIAGNOSIS — I10 ESSENTIAL HYPERTENSION: ICD-10-CM

## 2021-10-07 DIAGNOSIS — R53.82 CHRONIC FATIGUE: ICD-10-CM

## 2021-10-07 DIAGNOSIS — Z00.00 ANNUAL PHYSICAL EXAM: ICD-10-CM

## 2021-10-07 DIAGNOSIS — E78.2 MIXED HYPERLIPIDEMIA: ICD-10-CM

## 2021-10-07 DIAGNOSIS — D22.9 ENLARGED SKIN MOLE: ICD-10-CM

## 2021-10-07 DIAGNOSIS — Z00.00 ANNUAL PHYSICAL EXAM: Primary | ICD-10-CM

## 2021-10-07 LAB
25(OH)D3+25(OH)D2 SERPL-MCNC: 40 NG/ML (ref 30–96)
ALBUMIN SERPL BCP-MCNC: 4 G/DL (ref 3.5–5.2)
ALP SERPL-CCNC: 40 U/L (ref 55–135)
ALT SERPL W/O P-5'-P-CCNC: 57 U/L (ref 10–44)
ANION GAP SERPL CALC-SCNC: 12 MMOL/L (ref 8–16)
AST SERPL-CCNC: 29 U/L (ref 10–40)
BASOPHILS # BLD AUTO: 0.02 K/UL (ref 0–0.2)
BASOPHILS NFR BLD: 0.4 % (ref 0–1.9)
BILIRUB SERPL-MCNC: 0.9 MG/DL (ref 0.1–1)
BUN SERPL-MCNC: 13 MG/DL (ref 6–20)
CALCIUM SERPL-MCNC: 9.4 MG/DL (ref 8.7–10.5)
CHLORIDE SERPL-SCNC: 104 MMOL/L (ref 95–110)
CHOLEST SERPL-MCNC: 162 MG/DL (ref 120–199)
CHOLEST/HDLC SERPL: 3.4 {RATIO} (ref 2–5)
CO2 SERPL-SCNC: 22 MMOL/L (ref 23–29)
COMPLEXED PSA SERPL-MCNC: 4 NG/ML (ref 0–4)
CREAT SERPL-MCNC: 0.9 MG/DL (ref 0.5–1.4)
DIFFERENTIAL METHOD: ABNORMAL
EOSINOPHIL # BLD AUTO: 0.2 K/UL (ref 0–0.5)
EOSINOPHIL NFR BLD: 3 % (ref 0–8)
ERYTHROCYTE [DISTWIDTH] IN BLOOD BY AUTOMATED COUNT: 12.4 % (ref 11.5–14.5)
EST. GFR  (AFRICAN AMERICAN): >60 ML/MIN/1.73 M^2
EST. GFR  (NON AFRICAN AMERICAN): >60 ML/MIN/1.73 M^2
GLUCOSE SERPL-MCNC: 104 MG/DL (ref 70–110)
HCT VFR BLD AUTO: 47 % (ref 40–54)
HDLC SERPL-MCNC: 47 MG/DL (ref 40–75)
HDLC SERPL: 29 % (ref 20–50)
HGB BLD-MCNC: 16.3 G/DL (ref 14–18)
IMM GRANULOCYTES # BLD AUTO: 0.02 K/UL (ref 0–0.04)
IMM GRANULOCYTES NFR BLD AUTO: 0.4 % (ref 0–0.5)
LDLC SERPL CALC-MCNC: 100.8 MG/DL (ref 63–159)
LYMPHOCYTES # BLD AUTO: 1.5 K/UL (ref 1–4.8)
LYMPHOCYTES NFR BLD: 27.7 % (ref 18–48)
MCH RBC QN AUTO: 32.9 PG (ref 27–31)
MCHC RBC AUTO-ENTMCNC: 34.7 G/DL (ref 32–36)
MCV RBC AUTO: 95 FL (ref 82–98)
MONOCYTES # BLD AUTO: 0.4 K/UL (ref 0.3–1)
MONOCYTES NFR BLD: 6.5 % (ref 4–15)
NEUTROPHILS # BLD AUTO: 3.4 K/UL (ref 1.8–7.7)
NEUTROPHILS NFR BLD: 62 % (ref 38–73)
NONHDLC SERPL-MCNC: 115 MG/DL
NRBC BLD-RTO: 0 /100 WBC
PLATELET # BLD AUTO: 189 K/UL (ref 150–450)
PMV BLD AUTO: 10.8 FL (ref 9.2–12.9)
POTASSIUM SERPL-SCNC: 4.1 MMOL/L (ref 3.5–5.1)
PROT SERPL-MCNC: 7.1 G/DL (ref 6–8.4)
RBC # BLD AUTO: 4.95 M/UL (ref 4.6–6.2)
SODIUM SERPL-SCNC: 138 MMOL/L (ref 136–145)
TRIGL SERPL-MCNC: 71 MG/DL (ref 30–150)
WBC # BLD AUTO: 5.42 K/UL (ref 3.9–12.7)

## 2021-10-07 PROCEDURE — 36415 COLL VENOUS BLD VENIPUNCTURE: CPT | Performed by: STUDENT IN AN ORGANIZED HEALTH CARE EDUCATION/TRAINING PROGRAM

## 2021-10-07 PROCEDURE — 80061 LIPID PANEL: CPT | Performed by: STUDENT IN AN ORGANIZED HEALTH CARE EDUCATION/TRAINING PROGRAM

## 2021-10-07 PROCEDURE — 99999 PR PBB SHADOW E&M-EST. PATIENT-LVL IV: ICD-10-PCS | Mod: PBBFAC,,, | Performed by: STUDENT IN AN ORGANIZED HEALTH CARE EDUCATION/TRAINING PROGRAM

## 2021-10-07 PROCEDURE — 90686 IIV4 VACC NO PRSV 0.5 ML IM: CPT | Mod: S$GLB,,, | Performed by: STUDENT IN AN ORGANIZED HEALTH CARE EDUCATION/TRAINING PROGRAM

## 2021-10-07 PROCEDURE — 84153 ASSAY OF PSA TOTAL: CPT | Performed by: STUDENT IN AN ORGANIZED HEALTH CARE EDUCATION/TRAINING PROGRAM

## 2021-10-07 PROCEDURE — 85025 COMPLETE CBC W/AUTO DIFF WBC: CPT | Performed by: STUDENT IN AN ORGANIZED HEALTH CARE EDUCATION/TRAINING PROGRAM

## 2021-10-07 PROCEDURE — 82306 VITAMIN D 25 HYDROXY: CPT | Performed by: STUDENT IN AN ORGANIZED HEALTH CARE EDUCATION/TRAINING PROGRAM

## 2021-10-07 PROCEDURE — 90686 FLU VACCINE (QUAD) GREATER THAN OR EQUAL TO 3YO PRESERVATIVE FREE IM: ICD-10-PCS | Mod: S$GLB,,, | Performed by: STUDENT IN AN ORGANIZED HEALTH CARE EDUCATION/TRAINING PROGRAM

## 2021-10-07 PROCEDURE — 90471 IMMUNIZATION ADMIN: CPT | Mod: S$GLB,,, | Performed by: STUDENT IN AN ORGANIZED HEALTH CARE EDUCATION/TRAINING PROGRAM

## 2021-10-07 PROCEDURE — 99396 PREV VISIT EST AGE 40-64: CPT | Mod: 25,S$GLB,, | Performed by: STUDENT IN AN ORGANIZED HEALTH CARE EDUCATION/TRAINING PROGRAM

## 2021-10-07 PROCEDURE — 90471 FLU VACCINE (QUAD) GREATER THAN OR EQUAL TO 3YO PRESERVATIVE FREE IM: ICD-10-PCS | Mod: S$GLB,,, | Performed by: STUDENT IN AN ORGANIZED HEALTH CARE EDUCATION/TRAINING PROGRAM

## 2021-10-07 PROCEDURE — 80053 COMPREHEN METABOLIC PANEL: CPT | Performed by: STUDENT IN AN ORGANIZED HEALTH CARE EDUCATION/TRAINING PROGRAM

## 2021-10-07 PROCEDURE — 99999 PR PBB SHADOW E&M-EST. PATIENT-LVL IV: CPT | Mod: PBBFAC,,, | Performed by: STUDENT IN AN ORGANIZED HEALTH CARE EDUCATION/TRAINING PROGRAM

## 2021-10-07 PROCEDURE — 99396 PR PREVENTIVE VISIT,EST,40-64: ICD-10-PCS | Mod: 25,S$GLB,, | Performed by: STUDENT IN AN ORGANIZED HEALTH CARE EDUCATION/TRAINING PROGRAM

## 2021-10-07 RX ORDER — ROSUVASTATIN CALCIUM 5 MG/1
2.5 TABLET, COATED ORAL DAILY
Qty: 45 TABLET | Refills: 3 | Status: SHIPPED | OUTPATIENT
Start: 2021-10-07

## 2021-10-07 RX ORDER — AMLODIPINE BESYLATE 2.5 MG/1
2.5 TABLET ORAL DAILY
Qty: 90 TABLET | Refills: 3 | Status: SHIPPED | OUTPATIENT
Start: 2021-10-07

## 2021-10-07 RX ORDER — TELMISARTAN 40 MG/1
40 TABLET ORAL DAILY
Qty: 90 TABLET | Refills: 3 | Status: SHIPPED | OUTPATIENT
Start: 2021-10-07

## 2021-10-19 ENCOUNTER — LAB VISIT (OUTPATIENT)
Dept: LAB | Facility: HOSPITAL | Age: 61
End: 2021-10-19
Attending: STUDENT IN AN ORGANIZED HEALTH CARE EDUCATION/TRAINING PROGRAM
Payer: COMMERCIAL

## 2021-10-19 DIAGNOSIS — Z12.11 COLON CANCER SCREENING: ICD-10-CM

## 2021-10-19 PROCEDURE — 82274 ASSAY TEST FOR BLOOD FECAL: CPT | Performed by: STUDENT IN AN ORGANIZED HEALTH CARE EDUCATION/TRAINING PROGRAM

## 2021-10-21 LAB — HEMOCCULT STL QL IA: NEGATIVE

## 2022-03-10 ENCOUNTER — PATIENT MESSAGE (OUTPATIENT)
Dept: ADMINISTRATIVE | Facility: HOSPITAL | Age: 62
End: 2022-03-10
Payer: COMMERCIAL

## 2022-03-10 ENCOUNTER — PATIENT OUTREACH (OUTPATIENT)
Dept: ADMINISTRATIVE | Facility: HOSPITAL | Age: 62
End: 2022-03-10
Payer: COMMERCIAL

## 2022-10-03 ENCOUNTER — PATIENT MESSAGE (OUTPATIENT)
Dept: ADMINISTRATIVE | Facility: HOSPITAL | Age: 62
End: 2022-10-03
Payer: COMMERCIAL